# Patient Record
Sex: FEMALE
[De-identification: names, ages, dates, MRNs, and addresses within clinical notes are randomized per-mention and may not be internally consistent; named-entity substitution may affect disease eponyms.]

---

## 2017-01-14 ENCOUNTER — HOSPITAL ENCOUNTER (EMERGENCY)
Dept: HOSPITAL 5 - ED | Age: 42
LOS: 1 days | Discharge: PSYCHIATRIC OR SUBSTANCE USE FACILITY | End: 2017-01-15
Payer: MEDICARE

## 2017-01-14 DIAGNOSIS — F20.9: ICD-10-CM

## 2017-01-14 DIAGNOSIS — I10: ICD-10-CM

## 2017-01-14 DIAGNOSIS — Z88.8: ICD-10-CM

## 2017-01-14 DIAGNOSIS — R45.851: Primary | ICD-10-CM

## 2017-01-14 DIAGNOSIS — E11.9: ICD-10-CM

## 2017-01-14 DIAGNOSIS — R56.9: ICD-10-CM

## 2017-01-14 LAB
ALBUMIN SERPL-MCNC: 3.9 G/DL (ref 3.9–5)
ALBUMIN/GLOB SERPL: 1.1 %
ALP SERPL-CCNC: 95 UNITS/L (ref 35–129)
ALT SERPL-CCNC: 11 UNITS/L (ref 7–56)
ANION GAP SERPL CALC-SCNC: 20 MMOL/L
BASOPHILS NFR BLD AUTO: 0.5 % (ref 0–1.8)
BILIRUB DIRECT SERPL-MCNC: < 0.2 MG/DL (ref 0–0.2)
BILIRUB INDIRECT SERPL-MCNC: 0 MG/DL
BILIRUB SERPL-MCNC: < 0.2 MG/DL (ref 0.1–1.2)
BILIRUB UR QL STRIP: (no result)
BLOOD UR QL VISUAL: (no result)
BUN SERPL-MCNC: 6 MG/DL (ref 7–17)
BUN/CREAT SERPL: 10 %
CALCIUM SERPL-MCNC: 8.4 MG/DL (ref 8.4–10.2)
CHLORIDE SERPL-SCNC: 101.3 MMOL/L (ref 98–107)
CO2 SERPL-SCNC: 23 MMOL/L (ref 22–30)
EOSINOPHIL NFR BLD AUTO: 3.2 % (ref 0–4.3)
GLUCOSE SERPL-MCNC: 86 MG/DL (ref 65–100)
HCT VFR BLD CALC: 34.8 % (ref 30.3–42.9)
HGB BLD-MCNC: 11.9 GM/DL (ref 10.1–14.3)
KETONES UR STRIP-MCNC: (no result) MG/DL
LEUKOCYTE ESTERASE UR QL STRIP: (no result)
MCH RBC QN AUTO: 31 PG (ref 28–32)
MCHC RBC AUTO-ENTMCNC: 34 % (ref 30–34)
MCV RBC AUTO: 91 FL (ref 79–97)
MUCOUS THREADS #/AREA URNS HPF: (no result) /HPF
NITRITE UR QL STRIP: (no result)
PH UR STRIP: 6 [PH] (ref 5–7)
PLATELET # BLD: 238 K/MM3 (ref 140–440)
POTASSIUM SERPL-SCNC: 3.7 MMOL/L (ref 3.6–5)
PROT SERPL-MCNC: 7.3 G/DL (ref 6.3–8.2)
PROT UR STRIP-MCNC: (no result) MG/DL
RBC # BLD AUTO: 3.84 M/MM3 (ref 3.65–5.03)
RBC #/AREA URNS HPF: 1 /HPF (ref 0–6)
SODIUM SERPL-SCNC: 141 MMOL/L (ref 137–145)
URINE DRUGS OF ABUSE NOTE: (no result)
UROBILINOGEN UR-MCNC: < 2 MG/DL (ref ?–2)
WBC # BLD AUTO: 11.1 K/MM3 (ref 4.5–11)
WBC #/AREA URNS HPF: < 1 /HPF (ref 0–6)

## 2017-01-14 PROCEDURE — 80307 DRUG TEST PRSMV CHEM ANLYZR: CPT

## 2017-01-14 PROCEDURE — 80320 DRUG SCREEN QUANTALCOHOLS: CPT

## 2017-01-14 PROCEDURE — 36415 COLL VENOUS BLD VENIPUNCTURE: CPT

## 2017-01-14 PROCEDURE — 93005 ELECTROCARDIOGRAM TRACING: CPT

## 2017-01-14 PROCEDURE — 99285 EMERGENCY DEPT VISIT HI MDM: CPT

## 2017-01-14 PROCEDURE — 93010 ELECTROCARDIOGRAM REPORT: CPT

## 2017-01-14 PROCEDURE — 81001 URINALYSIS AUTO W/SCOPE: CPT

## 2017-01-14 PROCEDURE — G0480 DRUG TEST DEF 1-7 CLASSES: HCPCS

## 2017-01-14 PROCEDURE — 80074 ACUTE HEPATITIS PANEL: CPT

## 2017-01-14 PROCEDURE — 80156 ASSAY CARBAMAZEPINE TOTAL: CPT

## 2017-01-14 PROCEDURE — 84443 ASSAY THYROID STIM HORMONE: CPT

## 2017-01-14 PROCEDURE — 85025 COMPLETE CBC W/AUTO DIFF WBC: CPT

## 2017-01-14 PROCEDURE — 80048 BASIC METABOLIC PNL TOTAL CA: CPT

## 2017-01-14 NOTE — EMERGENCY DEPARTMENT REPORT
History of Present Illness





- General


Chief Complaint: Overdose


Stated Complaint: OVERDOSED/11 PILLS


Time Seen by Provider: 01/14/17 18:32


Source: patient


Mode of arrival: Ambulatory


Limitations: No Limitations





- History of Present Illness


Initial Comments: 





Patient is a 41-year-old female with history of diabetes, schizophrenia 

presenting today because of Suicide.  Patient says that she was very upset 

because her Depakote was missing today from her wallet.  She became very angry 

and suicidal she tried to kill herself by taking an excess of her medication.  

She took 2 pills of the 800 mg of Tegretol (normally takes 800 mg), 1000 mg 

Colace, 2 mg Risperdal (normally takes 6 mg), 2000 milligrams of metformin (

normally takes 1000 mg), 200 mg of Zoloft (takes 100 mg normally), 200 mg 

lisinopril (takes 100 mg normally).  Patient denies taking any aspirin or 

Tylenol.  She also denies any alcohol or illicit drug use. No hallucinations or 

delusions.





- Related Data


 Home Medications











 Medication  Instructions  Recorded  Confirmed  Last Taken


 


Lisinopril [Zestril TAB] 10 mg PO QDAY 01/30/16 11/13/16 11/13/16


 


QUEtiapine [SEROquel] 200 mg PO QHS 01/30/16 11/13/16 11/13/16


 


Sertraline [Zoloft] 50 mg PO QDAY 01/30/16 11/13/16 11/13/16


 


carBAMazepine [TEGretol] 200 mg PO Q12HR 01/30/16 11/13/16 11/13/16


 


hydrOXYzine PAMOATE [Vistaril] 25 mg PO Q8HR PRN 01/30/16 11/13/16 11/13/16


 


lamoTRIgine [LaMICtal] 150 mg PO DAILY 01/30/16 11/13/16 11/13/16


 


Phenazopyridine [Pyridium] 100 mg PO TID 02/27/16 11/13/16 11/13/16


 


Ziprasidone [Geodon] 60 mg PO BID 02/27/16 11/13/16 11/13/16











 Allergies











Allergy/AdvReac Type Severity Reaction Status Date / Time


 


haloperidol [From Haldol] AdvReac  Unknown Verified 05/27/16 19:03


 


haloperidol lactate AdvReac  Unknown Verified 05/27/16 19:03





[From Haldol]     














ED Review of Systems


ROS: 


Stated complaint: OVERDOSED/11 PILLS


Other details as noted in HPI





Comment: All other systems reviewed and negative


Constitutional: denies: chills, fever, weakness


Eyes: denies: vision change


ENT: denies: ear pain


Respiratory: denies: cough, shortness of breath


Gastrointestinal: denies: abdominal pain, vomiting


Genitourinary: denies: urgency, dysuria


Skin: denies: rash


Neurological: denies: headache, weakness, numbness, paresthesias


Psychiatric: suicidal thoughts





ED Past Medical Hx





- Past Medical History


Hx Hypertension: Yes


Hx Diabetes: Yes


Hx Seizures: Yes


Hx Psychiatric Treatment: Yes (schizophrenia)


Additional medical history: Hirsutism





- Surgical History


Past Surgical History?: Yes


Additional Surgical History: bladder surgery x2





- Social History


Smoking Status: Never Smoker


Substance Use Type: None





- Medications


Home Medications: 


 Home Medications











 Medication  Instructions  Recorded  Confirmed  Last Taken  Type


 


Lisinopril [Zestril TAB] 10 mg PO QDAY 01/30/16 11/13/16 11/13/16 History


 


QUEtiapine [SEROquel] 200 mg PO QHS 01/30/16 11/13/16 11/13/16 History


 


Sertraline [Zoloft] 50 mg PO QDAY 01/30/16 11/13/16 11/13/16 History


 


carBAMazepine [TEGretol] 200 mg PO Q12HR 01/30/16 11/13/16 11/13/16 History


 


hydrOXYzine PAMOATE [Vistaril] 25 mg PO Q8HR PRN 01/30/16 11/13/16 11/13/16 

History


 


lamoTRIgine [LaMICtal] 150 mg PO DAILY 01/30/16 11/13/16 11/13/16 History


 


Phenazopyridine [Pyridium] 100 mg PO TID 02/27/16 11/13/16 11/13/16 History


 


Ziprasidone [Geodon] 60 mg PO BID 02/27/16 11/13/16 11/13/16 History














ED Physical Exam





- General


Limitations: No Limitations


General appearance: alert





- Head


Head exam: Present: atraumatic





- Eye


Eye exam: Present: normal appearance





- ENT


ENT exam: Present: normal exam





- Neck


Neck exam: Present: normal inspection





- Respiratory


Respiratory exam: Present: normal lung sounds bilaterally, respiratory distress





- Cardiovascular


Cardiovascular Exam: Present: regular rate, normal heart sounds





- GI/Abdominal


GI/Abdominal exam: Present: soft.  Absent: distended, tenderness





- Extremities Exam


Extremities exam: Present: normal inspection, normal capillary refill





- Back Exam


Back exam: Present: normal inspection





- Neurological Exam


Neurological exam: Present: alert, oriented X3





- Psychiatric


Psychiatric exam: Present: normal affect, suicidal ideation





- Skin


Skin exam: Present: intact





ED Course


 Vital Signs











  01/14/17





  17:33


 


Temperature 98.9 F


 


Pulse Rate 90


 


Respiratory 16





Rate 


 


Blood Pressure 138/81


 


O2 Sat by Pulse 99





Oximetry 














ED Medical Decision Making





- Lab Data


Result diagrams: 


 01/14/17 18:39





 01/14/17 18:39





- Medical Decision Making





She has suicidal ideations and has made an attempt at suicide.  She has a 

labile affect. 





Due routine psych/tox labs.


EKG shows normal sinus rhythm without any ST-T changes, QRS is narrow, QTC of 

444 no dominant R-wave in aVR, unremarkable





Labs reviewed and are unremarkable, patient appears confident in the amount she 

took of each medication which would all be mild to minimal overdoses with 

minimal life threatening side effects.  When her normal lab results and EKG, 

she is medically cleared for psychiatric evaluation.





Labs reviewed and are unremarkable..  Patient medically cleared for psychiatric 

evaluation.


1013 form initiated as patient has made a suicide attempt.  She is not safe to 

be alone at this point.





Critical care attestation.: 


If time is entered above; I have spent that time in minutes in the direct care 

of this critically ill patient, excluding procedure time.








ED Disposition


Clinical Impression: 


 Suicidal ideation





Schizophrenia


Qualifiers:


 Schizophrenia type: unspecified Qualified Code(s): F20.9 - Schizophrenia, 

unspecified


Disposition: DC/TX PSY HOSP/PSY UNIT


Is pt being admited?: No


Does the pt Need Aspirin: No


Condition: Serious


Referrals: 


PRIMARY CARE,MD [Primary Care Provider] - 3-5 Days

## 2017-01-15 VITALS — DIASTOLIC BLOOD PRESSURE: 78 MMHG | SYSTOLIC BLOOD PRESSURE: 149 MMHG

## 2017-08-01 ENCOUNTER — HOSPITAL ENCOUNTER (EMERGENCY)
Dept: HOSPITAL 5 - ED | Age: 42
LOS: 1 days | Discharge: HOME | End: 2017-08-02
Payer: MEDICARE

## 2017-08-01 DIAGNOSIS — F31.9: ICD-10-CM

## 2017-08-01 DIAGNOSIS — F20.9: Primary | ICD-10-CM

## 2017-08-01 DIAGNOSIS — I10: ICD-10-CM

## 2017-08-01 DIAGNOSIS — Z88.8: ICD-10-CM

## 2017-08-01 DIAGNOSIS — E11.9: ICD-10-CM

## 2017-08-01 PROCEDURE — 80053 COMPREHEN METABOLIC PANEL: CPT

## 2017-08-01 PROCEDURE — 83690 ASSAY OF LIPASE: CPT

## 2017-08-01 PROCEDURE — 36415 COLL VENOUS BLD VENIPUNCTURE: CPT

## 2017-08-01 PROCEDURE — 99284 EMERGENCY DEPT VISIT MOD MDM: CPT

## 2017-08-01 PROCEDURE — 80307 DRUG TEST PRSMV CHEM ANLYZR: CPT

## 2017-08-01 PROCEDURE — 81001 URINALYSIS AUTO W/SCOPE: CPT

## 2017-08-01 PROCEDURE — 80320 DRUG SCREEN QUANTALCOHOLS: CPT

## 2017-08-01 PROCEDURE — G0480 DRUG TEST DEF 1-7 CLASSES: HCPCS

## 2017-08-01 PROCEDURE — 85025 COMPLETE CBC W/AUTO DIFF WBC: CPT

## 2017-08-02 VITALS — SYSTOLIC BLOOD PRESSURE: 149 MMHG | DIASTOLIC BLOOD PRESSURE: 84 MMHG

## 2017-08-02 LAB
ALBUMIN SERPL-MCNC: 4 G/DL (ref 3.9–5)
ALBUMIN/GLOB SERPL: 1.1 %
ALP SERPL-CCNC: 80 UNITS/L (ref 35–129)
ALT SERPL-CCNC: 11 UNITS/L (ref 7–56)
ANION GAP SERPL CALC-SCNC: 17 MMOL/L
BASOPHILS NFR BLD AUTO: 0.7 % (ref 0–1.8)
BILIRUB SERPL-MCNC: < 0.2 MG/DL (ref 0.1–1.2)
BILIRUB UR QL STRIP: (no result)
BLOOD UR QL VISUAL: (no result)
BUN SERPL-MCNC: 8 MG/DL (ref 7–17)
BUN/CREAT SERPL: 13.33 %
CALCIUM SERPL-MCNC: 9.2 MG/DL (ref 8.4–10.2)
CHLORIDE SERPL-SCNC: 99.2 MMOL/L (ref 98–107)
CO2 SERPL-SCNC: 27 MMOL/L (ref 22–30)
EOSINOPHIL NFR BLD AUTO: 3.7 % (ref 0–4.3)
GLUCOSE SERPL-MCNC: 110 MG/DL (ref 65–100)
HCT VFR BLD CALC: 36.4 % (ref 30.3–42.9)
HGB BLD-MCNC: 12.6 GM/DL (ref 10.1–14.3)
KETONES UR STRIP-MCNC: (no result) MG/DL
LEUKOCYTE ESTERASE UR QL STRIP: (no result)
LIPASE SERPL-CCNC: 39 UNITS/L (ref 13–60)
MCH RBC QN AUTO: 31 PG (ref 28–32)
MCHC RBC AUTO-ENTMCNC: 35 % (ref 30–34)
MCV RBC AUTO: 91 FL (ref 79–97)
NITRITE UR QL STRIP: (no result)
PH UR STRIP: 6 [PH] (ref 5–7)
PLATELET # BLD: 312 K/MM3 (ref 140–440)
POTASSIUM SERPL-SCNC: 4.2 MMOL/L (ref 3.6–5)
PROT SERPL-MCNC: 7.8 G/DL (ref 6.3–8.2)
PROT UR STRIP-MCNC: (no result) MG/DL
RBC # BLD AUTO: 4 M/MM3 (ref 3.65–5.03)
RBC #/AREA URNS HPF: 1 /HPF (ref 0–6)
SODIUM SERPL-SCNC: 139 MMOL/L (ref 137–145)
URINE DRUGS OF ABUSE NOTE: (no result)
UROBILINOGEN UR-MCNC: < 2 MG/DL (ref ?–2)
WBC # BLD AUTO: 8.6 K/MM3 (ref 4.5–11)
WBC #/AREA URNS HPF: < 1 /HPF (ref 0–6)

## 2017-08-02 NOTE — EMERGENCY DEPARTMENT REPORT
ED Psych HPI





- General


Chief Complaint: Psych


Stated Complaint: MENTAL HEALTH EVALUATION/HEARING VOICES


Time Seen by Provider: 08/02/17 09:41


Source: patient


Mode of arrival: Ambulatory





- History of Present Illness


Initial Comments: 





42-year-old female with a history of psychiatric illness here with complaints 

of increasing auditory hallucinations.  Patient has a history of schizophrenia 

and states that she is been having worsening voices over the last 2 weeks.  She 

does not feel suicidal or homicidal.  She has consisted an organized thoughts 

when discussing things me at this point.  Denies other complaints.  She does 

mention that she has irregular menstrual cycles.


MD Complaint: other


-: Gradual


Associated Psychiatric Symptoms: auditory hallucinations


History of same: Yes


Quality: getting worse


Improves With: medication


Worsens With: medication


Associated Symptoms: denies: confusion, headache, shortness of breath, nausea, 

vomiting, syncope, insomnia, other





- Related Data


 Home Medications











 Medication  Instructions  Recorded  Confirmed  Last Taken


 


Lisinopril [Zestril TAB] 10 mg PO QDAY 01/30/16 08/02/17 08/01/17


 


carBAMazepine [TEGretol] 200 mg PO QAM 01/30/16 08/02/17 08/01/17


 


metFORMIN [Glucophage] 500 mg PO BID 01/15/17 08/02/17 08/01/17


 


Divalproex Sodium 500 mg PO QHS 08/02/17 08/02/17 08/01/17


 


FLUoxetine [PROzac] 20 mg PO QDAY 08/02/17 08/02/17 08/01/17


 


Loxapine Succinate [Loxapine] 50 mg PO QHS 08/02/17 08/02/17 08/01/17


 


Solifenacin Succinate [Vesicare] 5 mg PO QDAY 08/02/17 08/02/17 08/01/17


 


carBAMazepine [TEGretol] 400 mg PO QHS 08/02/17 08/02/17 08/01/17











 Allergies











Allergy/AdvReac Type Severity Reaction Status Date / Time


 


haloperidol [From Haldol] AdvReac  Unknown Verified 05/27/16 19:03


 


haloperidol lactate AdvReac  Unknown Verified 05/27/16 19:03





[From Haldol]     














ED Review of Systems


ROS: 


Stated complaint: MENTAL HEALTH EVALUATION/HEARING VOICES


Other details as noted in HPI





Comment: All other systems reviewed and negative


Constitutional: denies: chills, fever


Eyes: denies: eye pain, eye discharge, vision change


ENT: denies: ear pain, throat pain


Respiratory: denies: cough, shortness of breath, wheezing


Cardiovascular: denies: chest pain, palpitations


Endocrine: no symptoms reported


Gastrointestinal: denies: abdominal pain, nausea, diarrhea


Genitourinary: denies: urgency, dysuria, discharge


Musculoskeletal: denies: back pain, joint swelling, arthralgia


Skin: denies: rash, lesions


Neurological: denies: headache, weakness, paresthesias


Psychiatric: anxiety, auditory hallucinations.  denies: depression


Hematological/Lymphatic: denies: easy bleeding, easy bruising





ED Past Medical Hx





- Past Medical History


Previous Medical History?: Yes


Hx Hypertension: Yes


Hx Diabetes: Yes


Hx Seizures: Yes


Hx Psychiatric Treatment: Yes (schizophrenia, bipolar)


Additional medical history: Hirsutism





- Surgical History


Past Surgical History?: Yes


Additional Surgical History: bladder surgery x2





- Family History


Family history: no significant





- Social History


Smoking Status: Never Smoker


Substance Use Type: None





- Medications


Home Medications: 


 Home Medications











 Medication  Instructions  Recorded  Confirmed  Last Taken  Type


 


Lisinopril [Zestril TAB] 10 mg PO QDAY 01/30/16 08/02/17 08/01/17 History


 


carBAMazepine [TEGretol] 200 mg PO QAM 01/30/16 08/02/17 08/01/17 History


 


metFORMIN [Glucophage] 500 mg PO BID 01/15/17 08/02/17 08/01/17 History


 


Divalproex Sodium 500 mg PO QHS 08/02/17 08/02/17 08/01/17 History


 


FLUoxetine [PROzac] 20 mg PO QDAY 08/02/17 08/02/17 08/01/17 History


 


Loxapine Succinate [Loxapine] 50 mg PO QHS 08/02/17 08/02/17 08/01/17 History


 


Solifenacin Succinate [Vesicare] 5 mg PO QDAY 08/02/17 08/02/17 08/01/17 History


 


carBAMazepine [TEGretol] 400 mg PO QHS 08/02/17 08/02/17 08/01/17 History














ED Physical Exam





- General


Limitations: No Limitations


General appearance: alert, in no apparent distress





- Head


Head exam: Present: atraumatic, normocephalic





- Eye


Eye exam: Present: normal appearance





- ENT


ENT exam: Present: mucous membranes moist





- Neck


Neck exam: Present: normal inspection





- Respiratory


Respiratory exam: Present: normal lung sounds bilaterally.  Absent: respiratory 

distress





- Cardiovascular


Cardiovascular Exam: Present: regular rate, normal rhythm.  Absent: systolic 

murmur, diastolic murmur, rubs, gallop





- GI/Abdominal


GI/Abdominal exam: Present: soft, normal bowel sounds





- Extremities Exam


Extremities exam: Present: normal inspection





- Back Exam


Back exam: Present: normal inspection





- Neurological Exam


Neurological exam: Present: alert, oriented X3





- Psychiatric


Psychiatric exam: Present: normal affect, normal mood





- Skin


Skin exam: Present: warm, dry, intact, normal color.  Absent: rash





ED Course


 Vital Signs











  08/02/17 08/02/17 08/02/17





  00:11 00:37 04:02


 


Temperature 98.1 F 98.1 F 97.6 F


 


Pulse Rate 91 H 91 H 72


 


Respiratory 20 20 18





Rate   


 


Blood Pressure  157/100 147/97


 


Blood Pressure   





[Left]   


 


Blood Pressure 157/100  





[Right]   


 


O2 Sat by Pulse 95 95 97





Oximetry   














  08/02/17 08/02/17 08/02/17





  08:07 09:23 09:45


 


Temperature 97.9 F 98.0 F 


 


Pulse Rate 73 78 


 


Respiratory 18 18 18





Rate   


 


Blood Pressure 146/92  


 


Blood Pressure  143/91 





[Left]   


 


Blood Pressure   





[Right]   


 


O2 Sat by Pulse 97 97 





Oximetry   














ED Medical Decision Making





- Lab Data


Result diagrams: 


 08/02/17 01:08





 08/02/17 01:08








 Laboratory Results - last 24 hr











  08/02/17 08/02/17 08/02/17





  01:08 01:08 01:08


 


WBC    8.6


 


RBC    4.00


 


Hgb    12.6


 


Hct    36.4


 


MCV    91


 


MCH    31


 


MCHC    35 H


 


RDW    13.6


 


Plt Count    312


 


Lymph % (Auto)    21.4


 


Mono % (Auto)    6.3


 


Eos % (Auto)    3.7


 


Baso % (Auto)    0.7


 


Lymph #    1.9


 


Mono #    0.5


 


Eos #    0.3


 


Baso #    0.1


 


Seg Neutrophils %    67.9


 


Seg Neutrophils #    5.9


 


Sodium   139 


 


Potassium   4.2 


 


Chloride   99.2 


 


Carbon Dioxide   27 


 


Anion Gap   17 


 


BUN   8 


 


Creatinine   0.6 L 


 


Estimated GFR   > 60 


 


BUN/Creatinine Ratio   13.33 


 


Glucose   110 H 


 


Calcium   9.2 


 


Total Bilirubin   < 0.20 


 


AST   14 


 


ALT   11 


 


Alkaline Phosphatase   80 


 


Total Protein   7.8 


 


Albumin   4.0 


 


Albumin/Globulin Ratio   1.1 


 


Lipase   39 


 


Urine Color   


 


Urine Turbidity   


 


Urine pH   


 


Ur Specific Gravity   


 


Urine Protein   


 


Urine Glucose (UA)   


 


Urine Ketones   


 


Urine Blood   


 


Urine Nitrite   


 


Urine Bilirubin   


 


Urine Urobilinogen   


 


Ur Leukocyte Esterase   


 


Urine WBC (Auto)   


 


Urine RBC (Auto)   


 


U Epithel Cells (Auto)   


 


Urine Opiates Screen   


 


Urine Methadone Screen   


 


Ur Barbiturates Screen   


 


Ur Phencyclidine Scrn   


 


Ur Amphetamines Screen   


 


U Benzodiazepines Scrn   


 


Urine Cocaine Screen   


 


U Marijuana (THC) Screen   


 


Drugs of Abuse Note   


 


Plasma/Serum Alcohol  < 0.01  














  08/02/17 08/02/17





  Unknown Unknown


 


WBC  


 


RBC  


 


Hgb  


 


Hct  


 


MCV  


 


MCH  


 


MCHC  


 


RDW  


 


Plt Count  


 


Lymph % (Auto)  


 


Mono % (Auto)  


 


Eos % (Auto)  


 


Baso % (Auto)  


 


Lymph #  


 


Mono #  


 


Eos #  


 


Baso #  


 


Seg Neutrophils %  


 


Seg Neutrophils #  


 


Sodium  


 


Potassium  


 


Chloride  


 


Carbon Dioxide  


 


Anion Gap  


 


BUN  


 


Creatinine  


 


Estimated GFR  


 


BUN/Creatinine Ratio  


 


Glucose  


 


Calcium  


 


Total Bilirubin  


 


AST  


 


ALT  


 


Alkaline Phosphatase  


 


Total Protein  


 


Albumin  


 


Albumin/Globulin Ratio  


 


Lipase  


 


Urine Color   Yellow


 


Urine Turbidity   Clear


 


Urine pH   6.0


 


Ur Specific Gravity   1.017


 


Urine Protein   <15 mg/dl


 


Urine Glucose (UA)   Neg


 


Urine Ketones   Neg


 


Urine Blood   Neg


 


Urine Nitrite   Neg


 


Urine Bilirubin   Neg


 


Urine Urobilinogen   < 2.0


 


Ur Leukocyte Esterase   Neg


 


Urine WBC (Auto)   < 1.0


 


Urine RBC (Auto)   1.0


 


U Epithel Cells (Auto)   1.0


 


Urine Opiates Screen  Presumptive negative 


 


Urine Methadone Screen  Presumptive negative 


 


Ur Barbiturates Screen  Presumptive negative 


 


Ur Phencyclidine Scrn  Presumptive negative 


 


Ur Amphetamines Screen  Presumptive negative 


 


U Benzodiazepines Scrn  Presumptive negative 


 


Urine Cocaine Screen  Presumptive negative 


 


U Marijuana (THC) Screen  Presumptive negative 


 


Drugs of Abuse Note  Disclamer 


 


Plasma/Serum Alcohol  














- Medical Decision Making





Patient is a 42-year-old female here with complaint of psychiatric illness.  

She is having increasing auditory hallucinations.  She is currently not 

suicidal or homicidal.  At this point I do not believe she requires a 1013.  I 

will have the patient evaluated by mental health.  She is currently medically 

clear for further evaluation.





Patient evaluated by mental health and agrees with the patient does not meet 

inpatient criteria.  Plan to discharge the patient to follow-up with her 

outpatient psychiatrist as planned.





Portions of this chart were dictated with dictation software.  There may be 

dictation errors contained within this note.


Critical care attestation.: 


If time is entered above; I have spent that time in minutes in the direct care 

of this critically ill patient, excluding procedure time.








ED Disposition


Clinical Impression: 


 Schizophrenia, Auditory hallucinations





Disposition: DC-01 TO HOME OR SELFCARE


Is pt being admited?: No


Condition: Stable


Instructions:  Schizophrenia (ED)


Additional Instructions: 


Please follow-up with your psychiatrist as planned.


Referrals: 


PRIMARY CARE,MD [Primary Care Provider] - 3-5 Days

## 2017-12-14 ENCOUNTER — HOSPITAL ENCOUNTER (EMERGENCY)
Dept: HOSPITAL 5 - ED | Age: 42
LOS: 1 days | Discharge: PSYCHIATRIC OR SUBSTANCE USE FACILITY | End: 2017-12-15
Payer: MEDICARE

## 2017-12-14 DIAGNOSIS — F20.9: ICD-10-CM

## 2017-12-14 DIAGNOSIS — F31.9: ICD-10-CM

## 2017-12-14 DIAGNOSIS — R45.851: Primary | ICD-10-CM

## 2017-12-14 DIAGNOSIS — I10: ICD-10-CM

## 2017-12-14 DIAGNOSIS — R45.850: ICD-10-CM

## 2017-12-14 DIAGNOSIS — E11.9: ICD-10-CM

## 2017-12-14 LAB
ANION GAP SERPL CALC-SCNC: 19 MMOL/L
BASOPHILS NFR BLD AUTO: 0.7 % (ref 0–1.8)
BILIRUB UR QL STRIP: (no result)
BLOOD UR QL VISUAL: (no result)
BUN SERPL-MCNC: 11 MG/DL (ref 7–17)
BUN/CREAT SERPL: 16 %
CALCIUM SERPL-MCNC: 9.1 MG/DL (ref 8.4–10.2)
CHLORIDE SERPL-SCNC: 100.5 MMOL/L (ref 98–107)
CO2 SERPL-SCNC: 24 MMOL/L (ref 22–30)
EOSINOPHIL NFR BLD AUTO: 4.1 % (ref 0–4.3)
GLUCOSE SERPL-MCNC: 108 MG/DL (ref 65–100)
HCT VFR BLD CALC: 36.2 % (ref 30.3–42.9)
HGB BLD-MCNC: 12.5 GM/DL (ref 10.1–14.3)
KETONES UR STRIP-MCNC: (no result) MG/DL
LEUKOCYTE ESTERASE UR QL STRIP: (no result)
MCH RBC QN AUTO: 32 PG (ref 28–32)
MCHC RBC AUTO-ENTMCNC: 35 % (ref 30–34)
MCV RBC AUTO: 92 FL (ref 79–97)
MUCOUS THREADS #/AREA URNS HPF: (no result) /HPF
NITRITE UR QL STRIP: (no result)
PH UR STRIP: 7 [PH] (ref 5–7)
PLATELET # BLD: 286 K/MM3 (ref 140–440)
POTASSIUM SERPL-SCNC: 4.3 MMOL/L (ref 3.6–5)
PROT UR STRIP-MCNC: (no result) MG/DL
RBC # BLD AUTO: 3.94 M/MM3 (ref 3.65–5.03)
RBC #/AREA URNS HPF: 1 /HPF (ref 0–6)
SODIUM SERPL-SCNC: 139 MMOL/L (ref 137–145)
URINE DRUGS OF ABUSE NOTE: (no result)
UROBILINOGEN UR-MCNC: < 2 MG/DL (ref ?–2)
WBC # BLD AUTO: 11 K/MM3 (ref 4.5–11)
WBC #/AREA URNS HPF: 1 /HPF (ref 0–6)

## 2017-12-14 PROCEDURE — 80320 DRUG SCREEN QUANTALCOHOLS: CPT

## 2017-12-14 PROCEDURE — 85025 COMPLETE CBC W/AUTO DIFF WBC: CPT

## 2017-12-14 PROCEDURE — 80307 DRUG TEST PRSMV CHEM ANLYZR: CPT

## 2017-12-14 PROCEDURE — G0480 DRUG TEST DEF 1-7 CLASSES: HCPCS

## 2017-12-14 PROCEDURE — 81001 URINALYSIS AUTO W/SCOPE: CPT

## 2017-12-14 PROCEDURE — 36415 COLL VENOUS BLD VENIPUNCTURE: CPT

## 2017-12-14 PROCEDURE — 99285 EMERGENCY DEPT VISIT HI MDM: CPT

## 2017-12-14 PROCEDURE — 80048 BASIC METABOLIC PNL TOTAL CA: CPT

## 2017-12-15 VITALS — DIASTOLIC BLOOD PRESSURE: 62 MMHG | SYSTOLIC BLOOD PRESSURE: 116 MMHG

## 2017-12-15 NOTE — CONSULTATION
History of Present Illness





- Reason for Consult


Consult date: 12/15/17


Reason for consult: Mental Health Evaluation


Requesting physician: KANIKA ANDREWS





- Chief Complaint


Chief complaint: 


"I want to die"





- History of Present Psychiatric Illness


41 yo female who comes in today due to suicidal/homicidal ideation. Today 

patient is calm and cooperative during the assessment. She stated that she 

wanted to kill herself because of the way she is treated at her group home. She 

stated that a staff member name "AVA "isn't professional with the residents at 

her group home. She stated getting into an argument with "CC the staff member 

prior to her admission to the hospital. She stated that he interactions with 

this person make her "depressed" and want to overdose on pills. She stated 

overdosing in the past. She stated depression has always been an issue for her 

and wanting to end her life. She stated that her medications was adjusted 

recently, but feels like they are not effective at this time. She stated that 

she hear voices "constantly." She stated that the voices call her all types of 

nasty names. She denies HI's and VH's. She admit to sleep disturbance, but 

denies a poor appetite. She denies recreational drug use and alcohol 

consumption (etoh).  








Medications and Allergies


 Allergies











Allergy/AdvReac Type Severity Reaction Status Date / Time


 


paliperidone [From Invega] Allergy  Unknown Verified 12/14/17 21:53


 


haloperidol [From Haldol] AdvReac  Unknown Verified 05/27/16 19:03


 


haloperidol lactate AdvReac  Unknown Verified 05/27/16 19:03





[From Haldol]     











 Home Medications











 Medication  Instructions  Recorded  Confirmed  Last Taken  Type


 


Lisinopril [Zestril TAB] 10 mg PO QDAY 01/30/16 08/02/17 08/01/17 History


 


carBAMazepine [TEGretol] 200 mg PO QAM 01/30/16 08/02/17 08/01/17 History


 


metFORMIN [Glucophage] 500 mg PO BID 01/15/17 08/02/17 08/01/17 History


 


Divalproex Sodium 500 mg PO QHS 08/02/17 08/02/17 08/01/17 History


 


FLUoxetine [PROzac] 20 mg PO QDAY 08/02/17 08/02/17 08/01/17 History


 


Loxapine Succinate [Loxapine] 50 mg PO QHS 08/02/17 08/02/17 08/01/17 History


 


Solifenacin Succinate [Vesicare] 5 mg PO QDAY 08/02/17 08/02/17 08/01/17 History


 


carBAMazepine [TEGretol] 400 mg PO QHS 08/02/17 08/02/17 08/01/17 History














Past psychiatric history





- Past Medical History


Past Medical History: diabetes, seizures


Past Surgical History: No surgical history





- past Psychiatric treatment and history


Psych: Bipolar, Schizophrenia


psychiatric treatment history: 


Multiple inpatient psy settings. Fam hx of Bipolar DO.








- Social History


Social history: other (Reside at a group home)





Mental Status Exam





- Vital signs


 Last Vital Signs











Temp  98.4 F   12/14/17 22:52


 


Pulse  82   12/14/17 22:52


 


Resp  18   12/14/17 22:52


 


BP  116/62   12/15/17 02:00


 


Pulse Ox  96   12/15/17 02:00














- Exam


Narrative exam: 


MSE:





 Appearance: calm, cooperative


 Behavior: regular eye contact 


 Speech: regular rate and tone  


 Mood: withdrawn


 Affect: congruent to mood


 Thought Process: circumstantial 


 Thought Content: denies HI's and VH's


 Motor Activity: ambulatory


 Cognition: A/Ox3 


 Insight: variable


 Judgment: variable











Results


Result Diagrams: 


 12/14/17 22:13





 12/14/17 22:13


 Abnormal lab results











  12/14/17 12/14/17 Range/Units





  22:13 22:13 


 


MCHC   35 H  (30-34)  %


 


Eos #   0.5 H  (0.0-0.4)  K/mm3


 


Glucose  108 H   ()  mg/dL








All other labs normal.








Assessment and Plan


Assessment and plan: 


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 Today patient is calm and 

cooperative during the assessment. Patient experiencing AH's. 





DDx: R/O Bipolar, R/O Schizoaffective DO, R/O MDD





Recommendation/Plan: Continue 1013 with placement to [facility] today.
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
## 2018-05-15 ENCOUNTER — HOSPITAL ENCOUNTER (EMERGENCY)
Dept: HOSPITAL 5 - ED | Age: 43
LOS: 1 days | Discharge: HOME | End: 2018-05-16
Payer: MEDICARE

## 2018-05-15 DIAGNOSIS — Z79.84: ICD-10-CM

## 2018-05-15 DIAGNOSIS — I10: ICD-10-CM
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
## 2018-05-15 DIAGNOSIS — Z88.8: ICD-10-CM
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
## 2018-05-15 DIAGNOSIS — E11.9: ICD-10-CM
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
## 2018-05-15 DIAGNOSIS — S00.511A: Primary | ICD-10-CM

## 2018-05-15 DIAGNOSIS — K92.0: ICD-10-CM

## 2018-05-15 DIAGNOSIS — F20.9: ICD-10-CM

## 2018-05-15 DIAGNOSIS — F31.9: ICD-10-CM

## 2018-05-15 PROCEDURE — 99283 EMERGENCY DEPT VISIT LOW MDM: CPT

## 2018-05-15 PROCEDURE — 70140 X-RAY EXAM OF FACIAL BONES: CPT

## 2018-05-16 VITALS — DIASTOLIC BLOOD PRESSURE: 81 MMHG | SYSTOLIC BLOOD PRESSURE: 142 MMHG

## 2018-05-16 NOTE — EMERGENCY DEPARTMENT REPORT
ED Fall HPI





- General


Chief Complaint: Fall


Stated Complaint: FALL


Time Seen by Provider: 05/16/18 06:11


Source: patient


Mode of arrival: Ambulatory





- History of Present Illness


Initial Comments: 





This is a 43-year-old female nontoxic, well nourished in appearance, no acute 

signs of distress presents to the ED with c/o of upper lip abrasion and pain. 

Patient stated approximately 1 day ago she tripped and fell on her face. 

Patient denies any loss of consciousness, headache, nausea, vomiting, chest pain

, shortness of breath, blurry vision, numbness, tingling, back pain or neck 

pain.  She is up-to-date with tetanus 2016.  Allergies includes Haldol and 

Invega.  Past medical history includes diabetes hypertension and psychiatric. 


MD Complaint: fall


-: days(s) (1)


Fall From: standing


When Fall Occurred: unsure


Fall Witnessed: no


Place Fall Occurred: nursing home/SNF


Loss of Consciousness: none


Prolonged Down Time?: no


Symptoms Prior to Fall: none


Location: face


Severity: mild


Severity scale (0 -10): 3


Quality: aching


Context: tripped/slipped


Associated Symptoms: denies.  denies: headache, neck pain, numbness, weakness, 

chest paint, shortness of breath, abdominal pain, hematuria, unable to walk, 

lightheaded, vertigo, confusion





- Related Data


 Home Medications











 Medication  Instructions  Recorded  Confirmed  Last Taken


 


Lisinopril [Zestril TAB] 10 mg PO QDAY 01/30/16 08/02/17 08/01/17


 


carBAMazepine [TEGretol] 200 mg PO QAM 01/30/16 08/02/17 08/01/17


 


metFORMIN [Glucophage] 500 mg PO BID 01/15/17 08/02/17 08/01/17


 


Divalproex Sodium 500 mg PO QHS 08/02/17 08/02/17 08/01/17


 


FLUoxetine [PROzac] 20 mg PO QDAY 08/02/17 08/02/17 08/01/17


 


Loxapine Succinate [Loxapine] 50 mg PO QHS 08/02/17 08/02/17 08/01/17


 


Solifenacin Succinate [Vesicare] 5 mg PO QDAY 08/02/17 08/02/17 08/01/17


 


carBAMazepine [TEGretol] 400 mg PO QHS 08/02/17 08/02/17 08/01/17








 Previous Rx's











 Medication  Instructions  Recorded  Last Taken  Type


 


Amoxicillin/K Clav Tab [Augmentin 1 tab PO Q12HR #20 tab 05/16/18 Unknown Rx





875 mg]    


 


Ibuprofen [Motrin] 600 mg PO Q8H PRN #30 tablet 05/16/18 Unknown Rx











 Allergies











Allergy/AdvReac Type Severity Reaction Status Date / Time


 


paliperidone [From Invega] Allergy  Unknown Verified 12/14/17 21:53


 


haloperidol [From Haldol] AdvReac  Unknown Verified 05/27/16 19:03


 


haloperidol lactate AdvReac  Unknown Verified 05/27/16 19:03





[From Haldol]     














ED Review of Systems


ROS: 


Stated complaint: FALL


Other details as noted in HPI





Constitutional: denies: chills, fever


Eyes: denies: eye pain, eye discharge, vision change


ENT: denies: ear pain, throat pain


Respiratory: denies: cough, shortness of breath, wheezing


Cardiovascular: denies: chest pain, palpitations


Endocrine: no symptoms reported


Gastrointestinal: denies: abdominal pain, nausea, diarrhea


Genitourinary: denies: urgency, dysuria, discharge


Musculoskeletal: denies: back pain, joint swelling, arthralgia


Skin: denies: rash, lesions


Neurological: denies: headache, weakness, paresthesias


Psychiatric: denies: anxiety, depression


Hematological/Lymphatic: denies: easy bleeding, easy bruising





ED Past Medical Hx





- Past Medical History


Previous Medical History?: Yes


Hx Hypertension: Yes


Hx Diabetes: Yes


Hx Seizures: Yes


Hx Psychiatric Treatment: Yes (schizophrenia, bipolar)


Additional medical history: Hirsutism





- Surgical History


Past Surgical History?: Yes


Additional Surgical History: bladder surgery x2





- Social History


Smoking Status: Never Smoker


Substance Use Type: None





- Medications


Home Medications: 


 Home Medications











 Medication  Instructions  Recorded  Confirmed  Last Taken  Type


 


Lisinopril [Zestril TAB] 10 mg PO QDAY 01/30/16 08/02/17 08/01/17 History


 


carBAMazepine [TEGretol] 200 mg PO QAM 01/30/16 08/02/17 08/01/17 History


 


metFORMIN [Glucophage] 500 mg PO BID 01/15/17 08/02/17 08/01/17 History


 


Divalproex Sodium 500 mg PO QHS 08/02/17 08/02/17 08/01/17 History


 


FLUoxetine [PROzac] 20 mg PO QDAY 08/02/17 08/02/17 08/01/17 History


 


Loxapine Succinate [Loxapine] 50 mg PO QHS 08/02/17 08/02/17 08/01/17 History


 


Solifenacin Succinate [Vesicare] 5 mg PO QDAY 08/02/17 08/02/17 08/01/17 History


 


carBAMazepine [TEGretol] 400 mg PO QHS 08/02/17 08/02/17 08/01/17 History


 


Amoxicillin/K Clav Tab [Augmentin 1 tab PO Q12HR #20 tab 05/16/18  Unknown Rx





875 mg]     


 


Ibuprofen [Motrin] 600 mg PO Q8H PRN #30 tablet 05/16/18  Unknown Rx














ED Physical Exam





- General


Limitations: No Limitations


General appearance: alert, in no apparent distress





- Head


Head exam: Present: atraumatic, normocephalic





- Eye


Eye exam: Present: normal appearance


Pupils: Present: normal accommodation





- ENT


ENT exam: Present: normal exam, mucous membranes moist





- Neck


Neck exam: Present: normal inspection, full ROM.  Absent: tenderness, 

meningismus, lymphadenopathy





- Respiratory


Respiratory exam: Present: normal lung sounds bilaterally.  Absent: respiratory 

distress





- Cardiovascular


Cardiovascular Exam: Present: regular rate, normal rhythm, normal heart sounds.

  Absent: bradycardia, tachycardia, irregular rhythm, systolic murmur, 

diastolic murmur, rubs, gallop





- GI/Abdominal


GI/Abdominal exam: Present: soft, normal bowel sounds.  Absent: distended, 

tenderness, guarding, rebound, rigid, diminished bowel sounds





- Rectal


Rectal exam: Present: deferred





- Extremities Exam


Extremities exam: Present: normal inspection, full ROM, normal capillary refill





- Back Exam


Back exam: Present: normal inspection, full ROM





- Neurological Exam


Neurological exam: Present: alert, oriented X3, CN II-XII intact, normal gait





- Expanded Neurological Exam


  ** Expanded


Patient oriented to: Present: person, place, time


Cranial nerves: EOM's Intact: Normal, Gag Reflex: Normal, Facial Sensation: 

Normal


Cerebellar function: Finger to Nose: Normal


Upper motor neuron: Pronator Drift: Normal, Sensory Extinction: Normal


Sensory exam: Upper Extremity Light Touch: Normal, Upper Extremity Pin Prick: 

Normal, Upper Extremity Temperature: Normal, UE 2 Point Discrimination: Normal, 

Lower Extremity Light Touch: Normal, Lower Extremity Pin Prick: Normal, Lower 

Extremity Temperature: Normal, LE 2 Point Discrimination: Normal


Motor strength exam: RUE: 5, LUE: 5, RLE: 5, LLE: 5


Best Eye Response ([place]): (4) open spontaneously


Best Motor Response ([place]): (6) obeys commands


Best Verbal Response ([place]): (5) oriented


[place] Total: 15





- Psychiatric


Psychiatric exam: Present: normal affect, normal mood





- Skin


Skin exam: Present: warm, dry, intact, normal color.  Absent: rash





- Other


Other exam information: 





1 cm superficial laceration to upper inner lip. Bleeding under control. 





ED Course





 Vital Signs











  05/16/18





  01:02


 


Temperature 98.7 F


 


Pulse Rate 76


 


Respiratory 16





Rate 


 


Blood Pressure 163/82


 


O2 Sat by Pulse 100





Oximetry 














- Reevaluation(s)


Reevaluation #1: 





05/16/18 07:43


Patient is speaking in full sentences with no signs of distress noted.





ED Medical Decision Making





- Medical Decision Making





This is a 43-year-old female that presents with laceration/abrasion.  Patient 

is stable and was examined by me.  Laceration being 24 hours and is very 

superficial and only 1 cm I did not suture the laceration.  The laceration has 

been cleaned with soap and water. A facial x-ray has been obtained and dictated 

by the radiologist within normal limits.  Patient is notified of the x-ray 

report with no questions noted by the patient.  Patient is discharged with 

Augmentin and Motrin.  Patient is neurologically stable.  There is no stiff 

neck or neck pain.  Vital signs are stable.  Patient is afebrile.  Patient was 

referred to Follow-up with a primary care/neurologist doctor in 3-5 days or if 

symptoms worsen and continue return to emergency room as soon as possible.  At 

time of discharge, the patient does not seem toxic or ill in appearance.  No 

acute signs of distress noted.  Patient agrees to discharge treatment plan of 

care.  No further questions noted by the patient.


Critical care attestation.: 


If time is entered above; I have spent that time in minutes in the direct care 

of this critically ill patient, excluding procedure time.








ED Disposition


Clinical Impression: 


 Abrasion





Fall


Qualifiers:


 Encounter type: initial encounter Qualified Code(s): W19.XXXA - Unspecified 

fall, initial encounter





Disposition: DC-01 TO HOME OR SELFCARE


Is pt being admited?: No


Does the pt Need Aspirin: No


Condition: Stable


Instructions:  Abrasion (ED), Amoxicillin/Clavulanate Potassium (By mouth), 

Ibuprofen (By mouth)


Additional Instructions: 


Follow-up with a primary care doctor in 3-5 days or if symptoms worsen and 

continue return to emergency room as soon as possible. 


Prescriptions: 


Amoxicillin/K Clav Tab [Augmentin 875 mg] 1 tab PO Q12HR #20 tab


Ibuprofen [Motrin] 600 mg PO Q8H PRN #30 tablet


 PRN Reason: Pain


Referrals: 


PRIMARY CARE,MD [Primary Care Provider] - 3-5 Days


AUSTEN MONTANEZ MD [Staff Physician] - 3-5 Days


[facility] [Outside] - 3-5 Days


[facility] [Outside] - 3-5 Days


Forms:  Work/School Release Form(ED)

## 2018-05-16 NOTE — XRAY REPORT
FINAL REPORT



EXAM:  XR FACIAL BONES &lt; 3V



HISTORY:  fall with facial pain/lip lac 



COMPARISONS:  None.



FINDINGS:  

AP and lateral views of the face 



No displaced facial fracture identified a. Mild leftward

deviation and spurring of the nasal septum. No radiodense foreign

body. No fluid level seen within the imaged paranasal sinuses or

mastoid air cells. 



IMPRESSION:  

No displaced facial fracture identified. Consider CT for more

sensitive evaluation as warranted.

## 2018-05-16 NOTE — EMERGENCY DEPARTMENT REPORT
Chief Complaint: Fall


Stated Complaint: FALL


Time Seen by Provider: 05/16/18 06:11





- HPI


History of Present Illness: 





Patient reported that she fell while she was getting out of bed and hit her 

mouth and has a cut to her inner upper lip.  She is also complaining the pain 

around her mouth.  No medication taken prior to coming to the emergency room.  

Patient denies any headache or head injury.  Denies any nausea or vomiting.  

Denies any missing teeth.  Denies any





- ROS


Review of Systems: 





Systems are negative unless stated in HPI above.  Positive lip laceration, 

positive facial pain, positive fall.  Negative nausea vomiting, negative head 

injury or headache.  Negative back pain.





- Exam


Vital Signs: 


 Vital Signs











  05/16/18





  01:02


 


Temperature 98.7 F


 


Pulse Rate 76


 


Respiratory 16





Rate 


 


Blood Pressure 163/82


 


O2 Sat by Pulse 100





Oximetry 











Physical Exam: 





Gen.: This is a 43-year-old female well-nourished well-developed and nontoxic 

in appearance.


Mouth: Moist, no acute dental trauma noted.  Positive upper lip laceration.  

Patient able to speak without any difficulties and tongue is normal.  

Tenderness to palpate to the facial area around mouth.


MSE screening note: 


Focused history and physical exam performed.


Due to findings the following was ordered:











ED Medical Decision Making





- Medical Decision Making





MDM: Patient screened by ED provider, x-ray of facial bones order.  Patient 

given Motrin 800 mg by mouth for pain.  To be seen by oncoming provider.





ED Disposition for MSE


Condition: Stable


Referrals: 


PRIMARY CARE,MD [Primary Care Provider] - 3-5 Days

## 2018-06-10 ENCOUNTER — HOSPITAL ENCOUNTER (EMERGENCY)
Dept: HOSPITAL 5 - ED | Age: 43
Discharge: LEFT BEFORE DISCHARGE | End: 2018-06-10
Payer: MEDICARE

## 2018-06-10 VITALS — DIASTOLIC BLOOD PRESSURE: 100 MMHG | SYSTOLIC BLOOD PRESSURE: 168 MMHG

## 2018-06-10 DIAGNOSIS — E11.9: ICD-10-CM

## 2018-06-10 DIAGNOSIS — F20.9: ICD-10-CM

## 2018-06-10 DIAGNOSIS — F31.9: Primary | ICD-10-CM

## 2018-06-10 DIAGNOSIS — Z53.21: ICD-10-CM

## 2018-06-10 DIAGNOSIS — Z79.899: ICD-10-CM

## 2018-06-10 DIAGNOSIS — I10: ICD-10-CM

## 2018-06-10 LAB
BASOPHILS # (AUTO): 0.1 K/MM3 (ref 0–0.1)
BASOPHILS NFR BLD AUTO: 0.6 % (ref 0–1.8)
BENZODIAZEPINES SCREEN,URINE: (no result)
BILIRUB UR QL STRIP: (no result)
BLOOD UR QL VISUAL: (no result)
BUN SERPL-MCNC: 10 MG/DL (ref 7–17)
BUN/CREAT SERPL: 17 %
CALCIUM SERPL-MCNC: 9.1 MG/DL (ref 8.4–10.2)
EOSINOPHIL # BLD AUTO: 0.4 K/MM3 (ref 0–0.4)
EOSINOPHIL NFR BLD AUTO: 3.2 % (ref 0–4.3)
HCT VFR BLD CALC: 36.6 % (ref 30.3–42.9)
HEMOLYSIS INDEX: 12
HGB BLD-MCNC: 12.7 GM/DL (ref 10.1–14.3)
LYMPHOCYTES # BLD AUTO: 1.7 K/MM3 (ref 1.2–5.4)
LYMPHOCYTES NFR BLD AUTO: 14.8 % (ref 13.4–35)
MCH RBC QN AUTO: 32 PG (ref 28–32)
MCHC RBC AUTO-ENTMCNC: 35 % (ref 30–34)
MCV RBC AUTO: 91 FL (ref 79–97)
METHADONE SCREEN,URINE: (no result)
MONOCYTES # (AUTO): 0.7 K/MM3 (ref 0–0.8)
MONOCYTES % (AUTO): 6.1 % (ref 0–7.3)
MUCOUS THREADS #/AREA URNS HPF: (no result) /HPF
OPIATE SCREEN,URINE: (no result)
PH UR STRIP: 6 [PH] (ref 5–7)
PLATELET # BLD: 310 K/MM3 (ref 140–440)
PROT UR STRIP-MCNC: (no result) MG/DL
RBC # BLD AUTO: 4.01 M/MM3 (ref 3.65–5.03)
RBC #/AREA URNS HPF: 2 /HPF (ref 0–6)
UROBILINOGEN UR-MCNC: 2 MG/DL (ref ?–2)
WBC #/AREA URNS HPF: 1 /HPF (ref 0–6)

## 2018-06-10 PROCEDURE — 85025 COMPLETE CBC W/AUTO DIFF WBC: CPT

## 2018-06-10 PROCEDURE — 80307 DRUG TEST PRSMV CHEM ANLYZR: CPT

## 2018-06-10 PROCEDURE — 80048 BASIC METABOLIC PNL TOTAL CA: CPT

## 2018-06-10 PROCEDURE — 36415 COLL VENOUS BLD VENIPUNCTURE: CPT

## 2018-06-10 PROCEDURE — 80320 DRUG SCREEN QUANTALCOHOLS: CPT

## 2018-06-10 PROCEDURE — G0480 DRUG TEST DEF 1-7 CLASSES: HCPCS

## 2018-06-10 PROCEDURE — 81001 URINALYSIS AUTO W/SCOPE: CPT

## 2018-06-10 PROCEDURE — 84703 CHORIONIC GONADOTROPIN ASSAY: CPT
